# Patient Record
Sex: FEMALE | Race: WHITE | NOT HISPANIC OR LATINO | URBAN - METROPOLITAN AREA
[De-identification: names, ages, dates, MRNs, and addresses within clinical notes are randomized per-mention and may not be internally consistent; named-entity substitution may affect disease eponyms.]

---
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
## 2024-09-16 ENCOUNTER — OFFICE VISIT (OUTPATIENT)
Dept: URGENT CARE | Facility: CLINIC | Age: 19
End: 2024-09-16
Payer: COMMERCIAL

## 2024-09-16 VITALS
RESPIRATION RATE: 20 BRPM | BODY MASS INDEX: 21.82 KG/M2 | WEIGHT: 139 LBS | TEMPERATURE: 99 F | OXYGEN SATURATION: 97 % | HEIGHT: 67 IN | HEART RATE: 80 BPM | DIASTOLIC BLOOD PRESSURE: 82 MMHG | SYSTOLIC BLOOD PRESSURE: 114 MMHG

## 2024-09-16 DIAGNOSIS — R11.2 NAUSEA AND VOMITING, UNSPECIFIED VOMITING TYPE: Primary | ICD-10-CM

## 2024-09-16 RX ORDER — ONDANSETRON 4 MG/1
8 TABLET, ORALLY DISINTEGRATING ORAL
Status: COMPLETED | OUTPATIENT
Start: 2024-09-16 | End: 2024-09-16

## 2024-09-16 RX ORDER — ONDANSETRON 4 MG/1
8 TABLET, FILM COATED ORAL EVERY 8 HOURS PRN
Qty: 30 TABLET | Refills: 0 | Status: SHIPPED | OUTPATIENT
Start: 2024-09-16 | End: 2024-09-21

## 2024-09-16 RX ADMIN — ONDANSETRON 8 MG: 4 TABLET, ORALLY DISINTEGRATING ORAL at 04:09

## 2024-09-16 NOTE — PROGRESS NOTES
"Subjective:      Patient ID: Talia Mason is a 18 y.o. female.    Vitals:  height is 5' 7" (1.702 m) and weight is 63 kg (139 lb). Her temperature is 98.7 °F (37.1 °C). Her blood pressure is 114/82 and her pulse is 80. Her respiration is 20 and oxygen saturation is 97%.     Chief Complaint: Nausea      Pt is a 19 yo female presenting with nausea, loss of appetite and emesis.  Onset of symptoms was 2-3 days ago.  Pt states she threw up two days ago and has not thrown up since.  Pt reports using a friend's zofran on the first day or so with some relief.  Pt denies abdominal pain, diarrhea or body aches.  Pt denies any known exposures to covid or flu    Nausea  This is a new problem. The current episode started in the past 7 days. The problem occurs intermittently. The problem has been unchanged. Associated symptoms include nausea and vomiting (resolved). Pertinent negatives include no abdominal pain, chest pain, chills, congestion, coughing, fatigue, fever, headaches, myalgias or sore throat. The symptoms are aggravated by eating. Treatments tried: zofran. The treatment provided no relief.       Constitution: Positive for appetite change (poor). Negative for chills, fatigue and fever.   HENT:  Negative for ear pain, congestion, sinus pain and sore throat.    Cardiovascular:  Negative for chest pain.   Respiratory:  Negative for cough, sputum production and shortness of breath.    Gastrointestinal:  Positive for nausea and vomiting (resolved). Negative for abdominal pain and diarrhea.   Musculoskeletal:  Negative for muscle ache.   Neurological:  Negative for headaches.      Objective:     Physical Exam   Constitutional: She is oriented to person, place, and time.   HENT:   Head: Normocephalic.   Ears:   Right Ear: Hearing and external ear normal. No no drainage, swelling or tenderness. Tympanic membrane is not erythematous, not retracted and not bulging. No middle ear effusion.   Left Ear: Hearing and external ear " normal. No no drainage, swelling or tenderness. Tympanic membrane is not erythematous, not retracted and not bulging.  No middle ear effusion.   Nose: Nose normal. No mucosal edema, rhinorrhea or purulent discharge. Right sinus exhibits no maxillary sinus tenderness and no frontal sinus tenderness. Left sinus exhibits no maxillary sinus tenderness and no frontal sinus tenderness.   Mouth/Throat: Uvula is midline, oropharynx is clear and moist and mucous membranes are normal. No uvula swelling. No oropharyngeal exudate, posterior oropharyngeal edema or posterior oropharyngeal erythema.   Cardiovascular: Normal rate and regular rhythm.   Pulmonary/Chest: Effort normal and breath sounds normal.   Abdominal: Bowel sounds are normal. She exhibits no shifting dullness, no distension, no fluid wave, no abdominal bruit, no pulsatile midline mass and no mass. Soft. flat abdomen There is no splenomegaly or hepatomegaly. There is no abdominal tenderness. There is no rebound, no guarding, no tenderness at McBurney's point, negative Lezama's sign, no left CVA tenderness, negative Rovsing's sign, negative psoas sign and no right CVA tenderness.   Neurological: She is alert and oriented to person, place, and time.   Skin: Skin is warm and dry.   Nursing note and vitals reviewed.      Assessment:     1. Nausea and vomiting, unspecified vomiting type        Plan:       Nausea and vomiting, unspecified vomiting type  -     ondansetron (ZOFRAN) 4 MG tablet; Take 2 tablets (8 mg total) by mouth every 8 (eight) hours as needed for Nausea.  Dispense: 30 tablet; Refill: 0  -     ondansetron disintegrating tablet 8 mg      Patient Instructions   Nausea and vomiting, unspecified vomiting type    -     ondansetron disintegrating tablet 8 mg  - given in clinic @ 4:25 pm       -     ondansetron (ZOFRAN) 4 MG tablet; Take 2 tablets (8 mg total) by mouth every 8 (eight) hours as needed for Nausea.  Dispense: 30 tablet; Refill: 0    Use  prescribed anti-nausea medicine as needed and prescribed     - Take OTC Gas-x (simethicone) for abdominal discomfort.      - Avoid Imodium.    - Drink plenty of electrolytes and fluids.      Start off with liquid diet and progress as tolerated (see below)  Water and clear liquids are important so you do not get dehydrated. Drink a small amount at a time.    Do not force yourself to eat, especially if you have cramps, vomiting, or diarrhea. When you finally decide to start eating, do not eat large amounts at a time, even if you are hungry.    -  [place] diet.    If you eat, avoid fatty, greasy, spicy, or fried foods.  Do not eat dairy products if you have diarrhea; they can make the diarrhea worse    ED Precautions   If your symptoms worsen or fail to improve you should go to Emergency Department.     Watch for any increase pain, fever, localized pain to right lower abdomen, continued vomiting or diarrhea, not urinating, or blood in the stool.

## 2024-09-16 NOTE — LETTER
September 16, 2024      [facility]  [address] [facility] [address]  Phone: [phone]  Fax: [phone]       Patient: Talia Mason   YOB: 2005  Date of Visit: 09/16/2024    To Whom It May Concern:    Maggi Mason  was at [facility] on 09/16/2024. The patient may return to work/school on 9/17/24 with no restrictions. If you have any questions or concerns, or if I can be of further assistance, please do not hesitate to contact me.    Sincerely,    ABHISHEK DumontC